# Patient Record
Sex: FEMALE | Race: WHITE
[De-identification: names, ages, dates, MRNs, and addresses within clinical notes are randomized per-mention and may not be internally consistent; named-entity substitution may affect disease eponyms.]

---

## 2018-07-21 ENCOUNTER — HOSPITAL ENCOUNTER (EMERGENCY)
Dept: HOSPITAL 41 - JD.ED | Age: 48
Discharge: HOME | End: 2018-07-21
Payer: MEDICAID

## 2018-07-21 DIAGNOSIS — Z88.2: ICD-10-CM

## 2018-07-21 DIAGNOSIS — F17.210: ICD-10-CM

## 2018-07-21 DIAGNOSIS — R00.2: Primary | ICD-10-CM

## 2018-07-21 DIAGNOSIS — Z88.8: ICD-10-CM

## 2018-07-21 DIAGNOSIS — Z79.899: ICD-10-CM

## 2018-07-21 DIAGNOSIS — Z91.040: ICD-10-CM

## 2018-07-21 PROCEDURE — 93005 ELECTROCARDIOGRAM TRACING: CPT

## 2018-07-21 PROCEDURE — 36415 COLL VENOUS BLD VENIPUNCTURE: CPT

## 2018-07-21 PROCEDURE — 99285 EMERGENCY DEPT VISIT HI MDM: CPT

## 2018-07-21 PROCEDURE — 84443 ASSAY THYROID STIM HORMONE: CPT

## 2018-07-21 PROCEDURE — 80053 COMPREHEN METABOLIC PANEL: CPT

## 2018-07-21 PROCEDURE — 83735 ASSAY OF MAGNESIUM: CPT

## 2018-07-21 PROCEDURE — 84484 ASSAY OF TROPONIN QUANT: CPT

## 2018-07-21 PROCEDURE — 85379 FIBRIN DEGRADATION QUANT: CPT

## 2018-07-21 PROCEDURE — 85025 COMPLETE CBC W/AUTO DIFF WBC: CPT

## 2018-07-21 PROCEDURE — 71275 CT ANGIOGRAPHY CHEST: CPT

## 2018-07-21 NOTE — EDM.PDOC
ED HPI GENERAL MEDICAL PROBLEM





- General


Chief Complaint: Cardiovascular Problem


Stated Complaint: HANDS TINGLING/BLOOD SUGAR PROBLEMS


Time Seen by Provider: 07/21/18 12:53


Source of Information: Reports: Patient


History Limitations: Reports: No Limitations





- History of Present Illness


INITIAL COMMENTS - FREE TEXT/NARRATIVE: 





The patient presents with a rapid heart rate.  The patient woke up this morning 

and she felt something was not right.  She has been having elevated blood 

sugars.  She has not been put on metformin yet.  She recently was put on 

levothyroxine.  She has no chest pain.  She feels a little anxious.  Her heart 

is beating fast.  She has no fever, chills, cough, abdominal pain, nausea and 

vomiting.  She has no pain or edema in her legs.  The patient says she has been 

having some "lady" issues lately and she is on birth control.  She does smoke.


Onset: Gradual


Duration: Hour(s):


Severity: Moderate


Improves with: Reports: None


Worsens with: Reports: None


Associated Symptoms: Denies: Chest Pain, Cough, Fever/Chills, Headaches, Nausea/

Vomiting, Shortness of Breath





- Related Data


 Allergies











Allergy/AdvReac Type Severity Reaction Status Date / Time


 


latex Allergy  Itching Verified 07/21/18 12:57


 


prednisone Allergy  Swelling Verified 07/21/18 12:57


 


Sulfa (Sulfonamide Allergy  Vomiting Verified 07/21/18 12:57





Antibiotics)     











Home Meds: 


 Home Meds





ARIPiprazole [Aripiprazole] 20 mg PO DAILY 07/21/18 [History]


Levothyroxine 25 mcg PO DAILY 07/21/18 [History]


Mirtazapine 15 mg PO DAILY 07/21/18 [History]


Norethindrone AC-Eth Estradiol [Jesse 1.5 mg-30 Mcg Tablet] 1 tab PO DAILY 07/21 /18 [History]


Propranolol [Inderal] 20 mg PO BID 07/21/18 [History]


risperiDONE 2 mg PO BID 07/21/18 [History]


traMADol HCl [Tramadol HCl] 50 mg PO BID 07/21/18 [History]


traZODone HCl [Trazodone HCl] 100 mg PO DAILY 07/21/18 [History]











Past Medical History





- Past Health History


Medical/Surgical History: Denies Medical/Surgical History


OB/GYN History: Reports: Dysfunctional Uterine Bleeding


Psychiatric History: Reports: Other (See Below)


Other Psychiatric History: schizoaffective disorder


Endocrine/Metabolic History: Reports: Diabetes, Type II, Hypothyroidism





- Past Surgical History


GI Surgical History: Reports: Hernia Repair/Other





Social & Family History





- Tobacco Use


Smoking Status *Q: Current Every Day Smoker


Years of Tobacco use: 8


Packs/Tins Daily: 1


Second Hand Smoke Exposure: No





- Caffeine Use


Caffeine Use: Reports: Tea





- Alcohol Use


Days Per Week of Alcohol Use: 4


Number of Drinks Per Day: 1


Total Drinks Per Week: 4





- Recreational Drug Use


Recreational Drug Use: No





ED ROS GENERAL





- Review of Systems


Review Of Systems: See Below


Constitutional: Reports: No Symptoms


HEENT: Reports: No Symptoms


Respiratory: Reports: No Symptoms


Cardiovascular: Reports: Palpitations


Endocrine: Reports: No Symptoms


GI/Abdominal: Reports: No Symptoms


: Reports: No Symptoms


Musculoskeletal: Reports: No Symptoms





ED EXAM, GENERAL





- Physical Exam


Exam: See Below


Exam Limited By: No Limitations


General Appearance: Alert, No Apparent Distress


Ears: Normal External Exam


Nose: Normal Inspection


Head: Atraumatic, Normocephalic


Neck: Normal Inspection


Respiratory/Chest: No Respiratory Distress, Lungs Clear, Normal Breath Sounds


Cardiovascular: No Edema, No Murmur, Tachycardia


GI/Abdominal: Soft, Non-Tender, No Organomegaly, No Mass


Extremities: Normal Inspection


Neurological: Alert, Oriented, No Motor/Sensory Deficits





EKG INTERPRETATION


EKG Date: 07/21/18


Time: 13:36


Rhythm: Other (Sinus tachycardia)


Rate (Beats/Min): 121


Axis: Normal


P-Wave: Present


QRS: Normal


ST-T: Normal


QT: Normal





Course





- Vital Signs


Last Recorded V/S: 


 Last Vital Signs











Temp  98.9 F   07/21/18 12:44


 


Pulse  157 H  07/21/18 12:44


 


Resp  18   07/21/18 12:44


 


BP  157/97 H  07/21/18 12:44


 


Pulse Ox  97   07/21/18 12:44














- Orders/Labs/Meds


Orders: 


 Active Orders 24 hr











 Category Date Time Status


 


 Cardiac Monitoring [RC] .AS DIRECTED Care  07/21/18 13:17 Active


 


 EKG Documentation Completion [RC] STAT Care  07/21/18 13:17 Active


 


 Ang Chest [CT] Stat Exams  07/21/18 14:14 Taken


 


 Sodium Chloride 0.9% [Normal Saline] 100 ml Med  07/21/18 14:45 Active





 IV ASDIRECTED   


 


 Sodium Chloride 0.9% [Saline Flush] Med  07/21/18 14:36 Active





 10 ml FLUSH ONETIME PRN   








 Medication Orders





Sodium Chloride (Normal Saline)  100 mls @ 75 mls/hr IV ASDIRECTED HOWIE


   Last Admin: 07/21/18 15:05  Dose: 75 mls/hr


Sodium Chloride (Saline Flush)  10 ml FLUSH ONETIME PRN


   PRN Reason: IV FLUSH


   Last Admin: 07/21/18 15:05  Dose: 10 ml








Labs: 


 Laboratory Tests











  07/21/18 07/21/18 07/21/18 Range/Units





  13:30 13:30 13:30 


 


WBC  7.88    (3.98-10.04)  K/mm3


 


RBC  4.29    (3.98-5.22)  M/mm3


 


Hgb  12.4    (11.2-15.7)  gm/L


 


Hct  37.9    (34.1-44.9)  %


 


MCV  88.3    (79.4-94.8)  fl


 


MCH  28.9    (25.6-32.2)  pg


 


MCHC  32.7    (32.2-35.5)  g/dl


 


RDW Std Deviation  58.8 H    (36.4-46.3)  fL


 


Plt Count  240    (182-369)  K/mm3


 


MPV  9.4    (9.4-12.3)  fl


 


Neut % (Auto)  71.9 H    (34.0-71.1)  %


 


Lymph % (Auto)  21.4    (19.3-51.7)  %


 


Mono % (Auto)  5.1    (4.7-12.5)  %


 


Eos % (Auto)  1.1    (0.7-5.8)  


 


Baso % (Auto)  0.4    (0.1-1.2)  %


 


Neut # (Auto)  5.66    (1.56-6.13)  K/mm3


 


Lymph # (Auto)  1.69    (1.18-3.74)  K/mm3


 


Mono # (Auto)  0.40 H    (0.24-0.36)  K/mm3


 


Eos # (Auto)  0.09    (0.04-0.36)  K/mm3


 


Baso # (Auto)  0.03    (0.01-0.08)  K/mm3


 


D-Dimer, Quantitative   1.54 H   (0.19-0.50)  mg/L


 


Sodium    139  (136-145)  mEq/L


 


Potassium    3.8  (3.5-5.1)  mEq/L


 


Chloride    103  ()  mEq/L


 


Carbon Dioxide    24  (21-32)  mEq/L


 


Anion Gap    15.8 H  (5-15)  


 


BUN    12  (7-18)  mg/dL


 


Creatinine    1.0  (0.55-1.02)  mg/dL


 


Est Cr Clr Drug Dosing    55.68  mL/min


 


Estimated GFR (MDRD)    59  (>60)  mL/min


 


BUN/Creatinine Ratio    12.0 L  (14-18)  


 


Glucose    198 H  ()  mg/dL


 


Calcium    8.5  (8.5-10.1)  mg/dL


 


Magnesium    1.7 L  (1.8-2.4)  mg/dl


 


Total Bilirubin    0.2  (0.2-1.0)  mg/dL


 


AST    22  (15-37)  U/L


 


ALT    34  (14-59)  U/L


 


Alkaline Phosphatase    71  ()  U/L


 


Troponin I    0.028  (0.00-0.056)  ng/mL


 


Total Protein    7.2  (6.4-8.2)  g/dl


 


Albumin    3.1 L  (3.4-5.0)  g/dl


 


Globulin    4.1  gm/dL


 


Albumin/Globulin Ratio    0.8 L  (1-2)  


 


TSH 3rd Generation    1.409  (0.358-3.74)  uIU/mL











Meds: 


Medications











Generic Name Dose Route Start Last Admin





  Trade Name Freq  PRN Reason Stop Dose Admin


 


Sodium Chloride  100 mls @ 75 mls/hr  07/21/18 14:45  07/21/18 15:05





  Normal Saline  IV   75 mls/hr





  ASDIRECTED HOWIE   Administration





     





     





     





     


 


Sodium Chloride  10 ml  07/21/18 14:36  07/21/18 15:05





  Saline Flush  FLUSH   10 ml





  ONETIME PRN   Administration





  IV FLUSH   





     





     





     














Discontinued Medications














Generic Name Dose Route Start Last Admin





  Trade Name Freq  PRN Reason Stop Dose Admin


 


Iopamidol  100 ml  07/21/18 14:36  07/21/18 15:05





  Isovue-370 (76%)  IVPUSH  07/21/18 14:37  60 ml





  ONETIME ONE   Administration





     





     





     





     


 


Propranolol HCl  20 mg  07/21/18 13:18  07/21/18 13:54





  Inderal  PO  07/21/18 13:19  20 mg





  ONETIME ONE   Administration





     





     





     





     














- Re-Assessments/Exams


Free Text/Narrative Re-Assessment/Exam: 





07/21/18 14:58


I ordered labs, EKG and propranolol 20mg by mouth.  Her EKG shows a sinus 

tachycardia with no acute changes.  Her CBC looks good.  Her D-dimer was 

elevated at 1.54.  I have ordered a CT angio of her chest.  Her anion gap was 

elevated at 15.8.  Her glucose was elevated at 198.  Her magnesium was just a 

little low at 1.7.  Her troponin was negative and her TSH was normal.


07/21/18 15:55


Her CT shows no evidence of acute pulmonary embolism and trace pleural 

effusions.  She feels better now.  Her heart rate is down now.  She says she is 

supposed to take the atenolol as needed but she has been taking it 2 times per 

day for awhile.  She says she is taking it for anxiety.  She says when she gets 

anxious every thing runs fast.  I asked if her heart race goes fast and then 

she gets anxious.  She thinks it is the other way around.  I will have her keep 

taking the propranolol 2 times per day.





Departure





- Departure


Time of Disposition: 16:00


Disposition: Home, Self-Care 01


Condition: Good


Clinical Impression: 


 Palpitations





Referrals: 


Mary Jo Solitario MD [Primary Care Provider] - 1 Week


Forms:  ED Department Discharge


Additional Instructions: 


Take your medication as prescribed.  Please return if you are worse.





- My Orders


Last 24 Hours: 


My Active Orders





07/21/18 13:17


Cardiac Monitoring [RC] .AS DIRECTED 


EKG Documentation Completion [RC] STAT 





07/21/18 14:14


Ang Chest [CT] Stat 





07/21/18 14:36


Sodium Chloride 0.9% [Saline Flush]   10 ml FLUSH ONETIME PRN 





07/21/18 14:45


Sodium Chloride 0.9% [Normal Saline] 100 ml IV ASDIRECTED 














- Assessment/Plan


Last 24 Hours: 


My Active Orders





07/21/18 13:17


Cardiac Monitoring [RC] .AS DIRECTED 


EKG Documentation Completion [RC] STAT 





07/21/18 14:14


Ang Chest [CT] Stat 





07/21/18 14:36


Sodium Chloride 0.9% [Saline Flush]   10 ml FLUSH ONETIME PRN 





07/21/18 14:45


Sodium Chloride 0.9% [Normal Saline] 100 ml IV ASDIRECTED

## 2018-07-22 NOTE — CT
CT chest

 

Technique: Multiple axial sections through the chest were obtained.  

Intravenous contrast was utilized.  Study has been performed as a 

pulmonary angiogram protocol.

 

Comparison: No prior chest imaging.

 

Findings: Pulmonary arteries are fairly well-opacified.  No filling 

defects are seen to indicate pulmonary embolism.  Mediastinum and 

hilar regions show no adenopathy or mass.  Mild coronary artery 

calcification is seen.  No pericardial effusion is seen.  Small 

portion of the visualized upper abdominal structures are within normal

 limits.  Minimal bilateral pleural effusions are noted.

 

Nodule identified within the right middle lobe measuring 6-7 mm in 

size.  Lungs otherwise are clear without acute parenchymal change.

 

Impression:

1.  No findings of pulmonary embolism.

2.  Minimal bilateral pleural effusions.

3.  Right middle lobe nodule measuring 6-7 mm.  Recommend repeat study

 in 6 months if patient is a smoker or 12 month CT study if the 

patient is not a smoker.

 

Diagnostic code #9

 

I agree with preliminary report issued by [facility] ([facility] report finalized 

on 07/21/18, 4:17 PM Central Time)

## 2018-08-30 ENCOUNTER — HOSPITAL ENCOUNTER (EMERGENCY)
Dept: HOSPITAL 41 - JD.ED | Age: 48
Discharge: HOME | End: 2018-08-30
Payer: MEDICAID

## 2018-08-30 DIAGNOSIS — I10: Primary | ICD-10-CM

## 2018-08-30 DIAGNOSIS — F41.9: ICD-10-CM

## 2018-08-30 DIAGNOSIS — F17.210: ICD-10-CM

## 2018-08-30 DIAGNOSIS — Z91.040: ICD-10-CM

## 2018-08-30 DIAGNOSIS — Z79.899: ICD-10-CM

## 2018-08-30 DIAGNOSIS — Z88.2: ICD-10-CM

## 2018-08-30 DIAGNOSIS — E03.9: ICD-10-CM

## 2018-08-30 DIAGNOSIS — E11.9: ICD-10-CM

## 2018-08-30 NOTE — EDM.PDOC
ED HPI GENERAL MEDICAL PROBLEM





- General


Chief Complaint: Cardiovascular Problem


Stated Complaint: HIGH BLOOD PRESSURE


Time Seen by Provider: 08/30/18 11:40


Source of Information: Reports: Patient


History Limitations: Reports: No Limitations





- History of Present Illness


INITIAL COMMENTS - FREE TEXT/NARRATIVE: 


48-year-old female presenting with chief complaint of high blood pressure. 

Patient has a history of anxiety and hypertension for which she is prescribed 

propranolol by her primary care provider. She's been on this for about a year 

with no dosage changes. Recently she was directed to keep a blood pressure log 

by her PCP, today while checking her blood pressure first thing in the morning 

it was 171/118. Patient was a asymptomatic at that time and no chest pain 

shortness of breath headache or dizziness no visual changes either. She was 

alarmed with the numbers and so presented to the emergency department. While 

being wheeled back to the room from the triage area she said she had very mild 

chest tightness, which subsided less than a minute.





Denies Shortness of breath. Patient has no history of ACS, diabetes or 

hyperlipidemia. She does smoke, about 30 pack year history. Plans to quit





Associated Symptoms: Reports: No Other Symptoms


  ** Chest


Pain Score (Numeric/FACES): 3





- Related Data


 Allergies











Allergy/AdvReac Type Severity Reaction Status Date / Time


 


latex Allergy  Itching Verified 08/30/18 11:43


 


prednisone Allergy  Swelling Verified 08/30/18 11:43


 


Sulfa (Sulfonamide Allergy  Vomiting Verified 08/30/18 11:43





Antibiotics)     











Home Meds: 


 Home Meds





ARIPiprazole [Aripiprazole] 20 mg PO DAILY 07/21/18 [History]


Levothyroxine 25 mcg PO DAILY 07/21/18 [History]


Mirtazapine 15 mg PO DAILY 07/21/18 [History]


Norethindrone AC-Eth Estradiol [Jesse 1.5 mg-30 Mcg Tablet] 1 tab PO DAILY 07/21 /18 [History]


Propranolol [Inderal] 20 mg PO BID 07/21/18 [History]


risperiDONE 2 mg PO BID 07/21/18 [History]


traMADol HCl [Tramadol HCl] 50 mg PO BID 07/21/18 [History]


traZODone HCl [Trazodone HCl] 100 mg PO DAILY 07/21/18 [History]











Past Medical History





- Past Health History


Medical/Surgical History: Denies Medical/Surgical History


Cardiovascular History: Reports: Hypertension


OB/GYN History: Reports: Dysfunctional Uterine Bleeding


Psychiatric History: Reports: Other (See Below)


Other Psychiatric History: schizoaffective disorder


Endocrine/Metabolic History: Reports: Diabetes, Type II, Hypothyroidism





- Past Surgical History


GI Surgical History: Reports: Hernia Repair/Other





Social & Family History





- Tobacco Use


Smoking Status *Q: Current Every Day Smoker


Years of Tobacco use: 8


Packs/Tins Daily: 1





- Caffeine Use


Caffeine Use: Reports: Tea





ED ROS GENERAL





- Review of Systems


Review Of Systems: See Below


Constitutional: Reports: No Symptoms


HEENT: Reports: No Symptoms


Respiratory: Reports: No Symptoms


Cardiovascular: Reports: Other (chest tightness)


GI/Abdominal: Reports: No Symptoms


: Reports: No Symptoms


Musculoskeletal: Reports: No Symptoms


Skin: Reports: No Symptoms


Neurological: Reports: No Symptoms


Psychiatric: Reports: No Symptoms


Hematologic/Lymphatic: Reports: No Symptoms


Immunologic: Reports: No Symptoms





ED EXAM, GENERAL





- Physical Exam


Exam: See Below


Exam Limited By: No Limitations


General Appearance: Alert, No Apparent Distress


Head: Atraumatic, Normocephalic


Neck: Normal Inspection


Respiratory/Chest: No Respiratory Distress, Lungs Clear, Normal Breath Sounds


Cardiovascular: Normal Peripheral Pulses, Regular Rate, Rhythm


GI/Abdominal: Soft, Non-Tender


Neurological: Alert, Oriented, CN II-XII Intact, No Motor/Sensory Deficits


Psychiatric: Normal Affect, Normal Mood


Skin Exam: Warm, Dry, Intact





EKG INTERPRETATION


EKG Date: 08/30/18


Time: 11:46


Rhythm: NSR


Rate (Beats/Min): 89


Axis: LAD-Left Axis Deviation


P-Wave: Present


QRS: Normal


QT: Normal


ND/PQ Interval: 135


Comparison: NA - No Prior EKG (some signs of LVH by voltage criteria)





Course





- Vital Signs


Last Recorded V/S: 


 Last Vital Signs











Temp  36.2 C   08/30/18 11:44


 


Pulse  92   08/30/18 11:44


 


Resp  18   08/30/18 11:44


 


BP  152/94 H  08/30/18 11:44


 


Pulse Ox  95   08/30/18 11:44














- Orders/Labs/Meds


Orders: 


 Active Orders 24 hr











 Category Date Time Status


 


 EKG Documentation Completion [RC] STAT Care  08/30/18 11:48 Active














- Re-Assessments/Exams


Free Text/Narrative Re-Assessment/Exam: 





08/30/18 12:17


Differential diagnosis: Essential hypertension, unlikely hypertensive urgency 

or emergency


48-year-old female history of anxiety and hypertension presenting with chief 

complaint of high blood pressure at home monitor. Upon initial evaluation the 

patient had normal vital signs with a normal blood pressure. She was briefly 

symptomatically some chest pressure while being wheeled back from the waiting 

room. However it subsided within a minute with no intervention. Otherwise the 

patient is in asymptomatic when she's had hypertension. Plan is to discharge 

patient home and follow-up with her PCP for blood pressure medication 

adjustment.





08/31/18 12:48








Departure





- Departure


Time of Disposition: 12:00


Disposition: Home, Self-Care 01


Condition: Good


Clinical Impression: 


Hypertension


Qualifiers:


 Hypertension type: essential hypertension Qualified Code(s): I10 - Essential (

primary) hypertension





Instructions:  Heart Disease Prevention, How to Take Your Blood Pressure


Referrals: 


Mary Jo Solitario MD [Primary Care Provider] - 


Forms:  ED Department Discharge


Additional Instructions: 


You were evaluated in the emergency department today for high blood pressure. 

Your blood pressure and symptoms were not serious enough today to necessitate 

further diagnostic testing. It is safe free to go home at this time. Please 

follow up closely with your PCP for blood pressure medication adjustment. If at 

anytime he have new or worsening chest pain, shortness of breath, dizziness 

headache or visual changes and our concern flees present to the emergency 

department as soon as possible.





- My Orders


Last 24 Hours: 


My Active Orders





08/30/18 11:48


EKG Documentation Completion [RC] STAT 














- Assessment/Plan


Last 24 Hours: 


My Active Orders





08/30/18 11:48


EKG Documentation Completion [RC] STAT